# Patient Record
Sex: FEMALE | URBAN - METROPOLITAN AREA
[De-identification: names, ages, dates, MRNs, and addresses within clinical notes are randomized per-mention and may not be internally consistent; named-entity substitution may affect disease eponyms.]

---

## 2021-12-07 ENCOUNTER — OFFICE VISIT (OUTPATIENT)
Dept: URBAN - METROPOLITAN AREA CLINIC 56 | Facility: CLINIC | Age: 41
End: 2021-12-07
Payer: COMMERCIAL

## 2021-12-07 DIAGNOSIS — H52.4 PRESBYOPIA: Primary | ICD-10-CM

## 2021-12-07 PROCEDURE — 92134 CPTRZ OPH DX IMG PST SGM RTA: CPT | Performed by: OPTOMETRIST

## 2021-12-07 PROCEDURE — 92004 COMPRE OPH EXAM NEW PT 1/>: CPT | Performed by: OPTOMETRIST

## 2021-12-07 ASSESSMENT — INTRAOCULAR PRESSURE
OD: 14
OS: 15

## 2021-12-07 ASSESSMENT — VISUAL ACUITY
OS: 20/20
OD: 20/20

## 2021-12-07 ASSESSMENT — KERATOMETRY
OD: 41.80
OS: 41.65

## 2022-03-18 ENCOUNTER — OFFICE VISIT (OUTPATIENT)
Dept: URBAN - METROPOLITAN AREA CLINIC 56 | Facility: CLINIC | Age: 42
End: 2022-03-18

## 2022-03-18 PROCEDURE — V2799 MISC VISION ITEM OR SERVICE: HCPCS | Performed by: STUDENT IN AN ORGANIZED HEALTH CARE EDUCATION/TRAINING PROGRAM

## 2022-03-18 PROCEDURE — 92015 DETERMINE REFRACTIVE STATE: CPT | Performed by: STUDENT IN AN ORGANIZED HEALTH CARE EDUCATION/TRAINING PROGRAM

## 2022-03-18 ASSESSMENT — KERATOMETRY
OD: 41.97
OS: 41.80

## 2022-03-18 ASSESSMENT — VISUAL ACUITY
OD: 20/20
OS: 20/20

## 2022-03-18 NOTE — IMPRESSION/PLAN
Impression: Presbyopia: H52.4. Plan: will update spec Rx - demonstrated distance and near vision (+1.25 add) of updated MRx today with loose lenses in the [place] and patient states good vision OD and OS at both distances. Lengthy discussion to PAL given this is first progressive lens. If still having trouble with glasses after remake, it is NOT an Rx issue but a lens design/adaptation issue. If still having difficulty, will need to consider lined bifocal or separate distance and near glasses in the future. Also demonstrated +1.25 OU at near to mimic OTC readers - pt states comfortable near vision.

## 2023-03-24 ENCOUNTER — OFFICE VISIT (OUTPATIENT)
Dept: URBAN - METROPOLITAN AREA CLINIC 56 | Facility: LOCATION | Age: 43
End: 2023-03-24
Payer: COMMERCIAL

## 2023-03-24 DIAGNOSIS — H02.821 CYSTS OF RIGHT UPPER EYELID: Primary | ICD-10-CM

## 2023-03-24 DIAGNOSIS — H52.4 PRESBYOPIA: ICD-10-CM

## 2023-03-24 PROCEDURE — 99214 OFFICE O/P EST MOD 30 MIN: CPT | Performed by: STUDENT IN AN ORGANIZED HEALTH CARE EDUCATION/TRAINING PROGRAM

## 2023-03-24 PROCEDURE — 92134 CPTRZ OPH DX IMG PST SGM RTA: CPT | Performed by: STUDENT IN AN ORGANIZED HEALTH CARE EDUCATION/TRAINING PROGRAM

## 2023-03-24 ASSESSMENT — VISUAL ACUITY
OD: 20/20
OS: 20/20

## 2023-03-24 ASSESSMENT — KERATOMETRY
OD: 41.80
OS: 41.80

## 2023-03-24 ASSESSMENT — INTRAOCULAR PRESSURE
OS: 15
OD: 15

## 2023-03-24 NOTE — IMPRESSION/PLAN
Impression: Cysts of right upper eyelid: H02.821. Plan: pt complains of increased growth and irritation and desires excision. No features of malignancy. Discussed risk of losing lashes in area of excision. RTC for consult with plastics.

## 2023-05-11 ENCOUNTER — OFFICE VISIT (OUTPATIENT)
Dept: URBAN - METROPOLITAN AREA CLINIC 56 | Facility: LOCATION | Age: 43
End: 2023-05-11
Payer: COMMERCIAL

## 2023-05-11 DIAGNOSIS — D48.1 TUMOR OF UNCERTAIN BEHAVIOR OF CONNECTIVE TISSUE OF EYELID: Primary | ICD-10-CM

## 2023-05-11 PROCEDURE — 99204 OFFICE O/P NEW MOD 45 MIN: CPT | Performed by: STUDENT IN AN ORGANIZED HEALTH CARE EDUCATION/TRAINING PROGRAM

## 2023-05-11 PROCEDURE — 92285 EXTERNAL OCULAR PHOTOGRAPHY: CPT | Performed by: STUDENT IN AN ORGANIZED HEALTH CARE EDUCATION/TRAINING PROGRAM

## 2023-05-11 NOTE — IMPRESSION/PLAN
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 Plan: Neoplasm RUL. At lash line. Growing, irritating, foreign body sensation. No concerning features, appearance consistent with benign papilloma/skin tag. Will seek authorization from insurance and will perform neoplasm excision with biopsy next visit. Concerning for malignancy due to telangiectatic vessels and pearly appearance. H02.821 ([id number]) RUL Patient understands there will be some lash loss and that it could be permanent.

## 2023-06-21 ENCOUNTER — OFFICE VISIT (OUTPATIENT)
Dept: URBAN - METROPOLITAN AREA CLINIC 56 | Facility: LOCATION | Age: 43
End: 2023-06-21
Payer: COMMERCIAL

## 2023-06-21 DIAGNOSIS — D48.1 TUMOR OF UNCERTAIN BEHAVIOR OF CONNECTIVE TISSUE OF EYELID: Primary | ICD-10-CM

## 2023-06-21 DIAGNOSIS — H02.821 CYSTS OF RIGHT UPPER EYELID: ICD-10-CM

## 2023-06-21 PROCEDURE — 99213 OFFICE O/P EST LOW 20 MIN: CPT | Performed by: STUDENT IN AN ORGANIZED HEALTH CARE EDUCATION/TRAINING PROGRAM

## 2023-06-21 PROCEDURE — 67840 REMOVE EYELID LESION: CPT | Performed by: STUDENT IN AN ORGANIZED HEALTH CARE EDUCATION/TRAINING PROGRAM

## 2023-06-21 RX ORDER — NEOMYCIN SULFATE, POLYMYXIN B SULFATE AND DEXAMETHASONE 3.5; 10000; 1 MG/G; [USP'U]/G; MG/G
OINTMENT OPHTHALMIC
Qty: 3.5 | Refills: 0 | Status: ACTIVE
Start: 2023-06-21

## 2023-06-21 NOTE — IMPRESSION/PLAN
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 Plan: Neoplasm RUL. At lash line. Growing, irritating, foreign body sensation. No concerning features, appearance consistent with benign papilloma/skin tag. Patient wishes to have lesion excised. Risks, benefits, and alternatives of procedure were discussed. Written informed consent obtained and all of patient's questions answered. Lesion Excision Procedure Note - The eyelid was cleaned and then anesthetized with 2% lidocaine with epinephrine. The lesion was excised with Giovany scissors, flush with the adjacent skin. The site was cauterized as necessary to achieve hemostasis. The wound was small and was left to heal without direct closure (no sutures). Antibiotic ointment was applied. The patient tolerated the procedure well, and the procedure was completed without complications. Apply abx ointment bid x 1-2 weeks. Post op instructions reviewed. Return oculoplastics clinic prn. Call if any questions or concerns.